# Patient Record
Sex: FEMALE | Race: WHITE | NOT HISPANIC OR LATINO
[De-identification: names, ages, dates, MRNs, and addresses within clinical notes are randomized per-mention and may not be internally consistent; named-entity substitution may affect disease eponyms.]

---

## 2019-10-02 ENCOUNTER — LABORATORY RESULT (OUTPATIENT)
Age: 39
End: 2019-10-02

## 2019-10-10 ENCOUNTER — APPOINTMENT (OUTPATIENT)
Dept: INTERNAL MEDICINE | Facility: CLINIC | Age: 39
End: 2019-10-10
Payer: COMMERCIAL

## 2019-10-10 ENCOUNTER — NON-APPOINTMENT (OUTPATIENT)
Age: 39
End: 2019-10-10

## 2019-10-10 VITALS
DIASTOLIC BLOOD PRESSURE: 70 MMHG | HEART RATE: 62 BPM | BODY MASS INDEX: 18.95 KG/M2 | TEMPERATURE: 98.6 F | OXYGEN SATURATION: 97 % | HEIGHT: 64 IN | WEIGHT: 111 LBS | SYSTOLIC BLOOD PRESSURE: 80 MMHG

## 2019-10-10 VITALS — SYSTOLIC BLOOD PRESSURE: 100 MMHG | DIASTOLIC BLOOD PRESSURE: 60 MMHG

## 2019-10-10 DIAGNOSIS — Z82.0 FAMILY HISTORY OF EPILEPSY AND OTHER DISEASES OF THE NERVOUS SYSTEM: ICD-10-CM

## 2019-10-10 DIAGNOSIS — Z80.0 FAMILY HISTORY OF MALIGNANT NEOPLASM OF DIGESTIVE ORGANS: ICD-10-CM

## 2019-10-10 DIAGNOSIS — Z78.9 OTHER SPECIFIED HEALTH STATUS: ICD-10-CM

## 2019-10-10 DIAGNOSIS — Z83.49 FAMILY HISTORY OF OTHER ENDOCRINE, NUTRITIONAL AND METABOLIC DISEASES: ICD-10-CM

## 2019-10-10 DIAGNOSIS — Z81.8 FAMILY HISTORY OF OTHER MENTAL AND BEHAVIORAL DISORDERS: ICD-10-CM

## 2019-10-10 DIAGNOSIS — Z80.1 FAMILY HISTORY OF MALIGNANT NEOPLASM OF TRACHEA, BRONCHUS AND LUNG: ICD-10-CM

## 2019-10-10 DIAGNOSIS — R51 HEADACHE: ICD-10-CM

## 2019-10-10 PROCEDURE — 99396 PREV VISIT EST AGE 40-64: CPT | Mod: 25

## 2019-10-10 PROCEDURE — 99205 OFFICE O/P NEW HI 60 MIN: CPT | Mod: 25

## 2019-10-10 PROCEDURE — 93000 ELECTROCARDIOGRAM COMPLETE: CPT

## 2019-10-10 NOTE — HEALTH RISK ASSESSMENT
[Patient reported mammogram was normal] : Patient reported mammogram was normal [Patient reported PAP Smear was normal] : Patient reported PAP Smear was normal [Good] : ~his/her~  mood as  good [Yes] : Yes [0] : 2) Feeling down, depressed, or hopeless: Not at all (0) [With Family] : lives with family [# of Members in Household ___] :  household currently consist of [unfilled] member(s) [Unemployed] : unemployed [Graduate School] : graduate school [] :  [# Of Children ___] : has [unfilled] children [Feels Safe at Home] : Feels safe at home [Fully functional (bathing, dressing, toileting, transferring, walking, feeding)] : Fully functional (bathing, dressing, toileting, transferring, walking, feeding) [Fully functional (using the telephone, shopping, preparing meals, housekeeping, doing laundry, using] : Fully functional and needs no help or supervision to perform IADLs (using the telephone, shopping, preparing meals, housekeeping, doing laundry, using transportation, managing medications and managing finances) [Smoke Detector] : smoke detector [Carbon Monoxide Detector] : carbon monoxide detector [Seat Belt] :  uses seat belt [Sunscreen] : uses sunscreen [] : No [de-identified] : Dermatology- Dr. Greenberg  Ophthalmology-Dr. Harvey   Gynecology- Dr. De Leon [de-identified] : on weekends only [de-identified] : exercises 5 days per week  [de-identified] : one fall one  week ago [Change in mental status noted] : No change in mental status noted [Language] : denies difficulty with language [Behavior] : denies difficulty with behavior [Handling Complex Tasks] : denies difficulty handling complex tasks [Reasoning] : denies difficulty with reasoning [Reports changes in hearing] : Reports no changes in hearing [Reports changes in vision] : Reports no changes in vision [Reports changes in dental health] : Reports no changes in dental health [MammogramDate] : 10/18 [PapSmearDate] : 09/19

## 2019-10-10 NOTE — HEALTH RISK ASSESSMENT
[Patient reported PAP Smear was normal] : Patient reported PAP Smear was normal [Patient reported mammogram was normal] : Patient reported mammogram was normal [Good] : ~his/her~  mood as  good [Yes] : Yes [0] : 2) Feeling down, depressed, or hopeless: Not at all (0) [With Family] : lives with family [# of Members in Household ___] :  household currently consist of [unfilled] member(s) [Unemployed] : unemployed [Graduate School] : graduate school [] :  [# Of Children ___] : has [unfilled] children [Feels Safe at Home] : Feels safe at home [Fully functional (bathing, dressing, toileting, transferring, walking, feeding)] : Fully functional (bathing, dressing, toileting, transferring, walking, feeding) [Fully functional (using the telephone, shopping, preparing meals, housekeeping, doing laundry, using] : Fully functional and needs no help or supervision to perform IADLs (using the telephone, shopping, preparing meals, housekeeping, doing laundry, using transportation, managing medications and managing finances) [Smoke Detector] : smoke detector [Carbon Monoxide Detector] : carbon monoxide detector [Seat Belt] :  uses seat belt [Sunscreen] : uses sunscreen [] : No [de-identified] : Dermatology- Dr. Greenberg  Ophthalmology-Dr. Harvey   Gynecology- Dr. De Leon [de-identified] : on weekends only [de-identified] : exercises 5 days per week  [de-identified] : one fall one  week ago [Change in mental status noted] : No change in mental status noted [Language] : denies difficulty with language [Behavior] : denies difficulty with behavior [Handling Complex Tasks] : denies difficulty handling complex tasks [Reasoning] : denies difficulty with reasoning [Reports changes in hearing] : Reports no changes in hearing [Reports changes in vision] : Reports no changes in vision [Reports changes in dental health] : Reports no changes in dental health [MammogramDate] : 10/18 [PapSmearDate] : 09/19

## 2019-10-10 NOTE — PAST MEDICAL HISTORY
[Definite ___ (Date)] : the last menstrual period was [unfilled] [Normal Amount/Duration] : it was of a normal amount and duration [AB Spont ___] : miscarriages: [unfilled]  [Menstruating] : menstruating [Regular Cycle Intervals] : have been regular [Total Preg ___] : G[unfilled] [Live Births ___] : P[unfilled]

## 2019-10-10 NOTE — HISTORY OF PRESENT ILLNESS
[de-identified] : The patient is a 39-year-old female with past medical history significant for anxiety disorder presents for routine physical.  She reports that she was last seen by me approximately 5 years ago.  At that time she was felt to be suffering from underlying anxiety.  She admits that she was seen by a neurologist at that time who agreed.  Patient opted to not take medications at the time and has since been feeling much better.  She denies feeling anxious or depressed.  She continues to exercise regularly without difficulty.  The patient reports that approximately 1-1/2 weeks ago she tripped on her pant leg and hit her chin on the ground.  Since that time she has had a vague, dull, generalized headache.  She also admits that it was around the time of her period which may have contributed to her symptoms.  She has been taking Advil and drinking caffeine which has helped with her symptoms.  She admits that this week her headache is lessened.  She denies vision changes, photosensitivity, nausea, or vomiting. [FreeTextEntry1] : The patient is a 39 year  old female who presents for annual physical examination.\par

## 2019-10-10 NOTE — ASSESSMENT
[FreeTextEntry1] : Healthcare maintenance: Patient is scheduled for a mammogram next week.  She is up-to-date with her gynecological examination.

## 2019-10-10 NOTE — HISTORY OF PRESENT ILLNESS
[de-identified] : The patient is a 39-year-old female with past medical history significant for anxiety disorder presents for routine physical.  She reports that she was last seen by me approximately 5 years ago.  At that time she was felt to be suffering from underlying anxiety.  She admits that she was seen by a neurologist at that time who agreed.  Patient opted to not take medications at the time and has since been feeling much better.  She denies feeling anxious or depressed.  She continues to exercise regularly without difficulty.  The patient reports that approximately 1-1/2 weeks ago she tripped on her pant leg and hit her chin on the ground.  Since that time she has had a vague, dull, generalized headache.  She also admits that it was around the time of her period which may have contributed to her symptoms.  She has been taking Advil and drinking caffeine which has helped with her symptoms.  She admits that this week her headache is lessened.  She denies vision changes, photosensitivity, nausea, or vomiting. [FreeTextEntry1] : The patient is a 39 year  old female who presents for annual physical examination.\par

## 2020-11-02 ENCOUNTER — RESULT REVIEW (OUTPATIENT)
Age: 40
End: 2020-11-02

## 2021-10-28 ENCOUNTER — RESULT REVIEW (OUTPATIENT)
Age: 41
End: 2021-10-28

## 2022-10-17 ENCOUNTER — RESULT REVIEW (OUTPATIENT)
Age: 42
End: 2022-10-17

## 2023-01-12 ENCOUNTER — APPOINTMENT (OUTPATIENT)
Dept: DERMATOLOGY | Facility: CLINIC | Age: 43
End: 2023-01-12
Payer: COMMERCIAL

## 2023-01-12 VITALS — WEIGHT: 110 LBS | BODY MASS INDEX: 18.78 KG/M2 | HEIGHT: 64 IN

## 2023-01-12 DIAGNOSIS — L98.8 OTHER SPECIFIED DISORDERS OF THE SKIN AND SUBCUTANEOUS TISSUE: ICD-10-CM

## 2023-01-12 DIAGNOSIS — L57.0 ACTINIC KERATOSIS: ICD-10-CM

## 2023-01-12 DIAGNOSIS — L82.1 OTHER SEBORRHEIC KERATOSIS: ICD-10-CM

## 2023-01-12 PROCEDURE — 99204 OFFICE O/P NEW MOD 45 MIN: CPT

## 2023-01-25 ENCOUNTER — NON-APPOINTMENT (OUTPATIENT)
Age: 43
End: 2023-01-25

## 2023-01-25 ENCOUNTER — APPOINTMENT (OUTPATIENT)
Dept: SURGICAL ONCOLOGY | Facility: CLINIC | Age: 43
End: 2023-01-25
Payer: COMMERCIAL

## 2023-01-25 VITALS
RESPIRATION RATE: 16 BRPM | HEIGHT: 64 IN | WEIGHT: 110 LBS | HEART RATE: 69 BPM | OXYGEN SATURATION: 99 % | SYSTOLIC BLOOD PRESSURE: 104 MMHG | BODY MASS INDEX: 18.78 KG/M2 | DIASTOLIC BLOOD PRESSURE: 67 MMHG

## 2023-01-25 DIAGNOSIS — N64.52 NIPPLE DISCHARGE: ICD-10-CM

## 2023-01-25 PROCEDURE — 99205 OFFICE O/P NEW HI 60 MIN: CPT

## 2023-01-25 NOTE — ASSESSMENT
[FreeTextEntry1] : Right bloody nipple discharge \par BI-RADS 1 imaging January 2023 \par Reassured patient that index of suspicion for malignancy is low\par Suspect intraductal papilloma\par Will schedule for right duct excision \par Surgical procedure discussed in detail\par All questions answered

## 2023-01-25 NOTE — ADDENDUM
[FreeTextEntry1] : I, Yael Martin, acted solely as a scribe for Dr. Tomasz Maxwell on this date 01/25/2023.

## 2023-01-25 NOTE — HISTORY OF PRESENT ILLNESS
[de-identified] : Patient is a 43 y/o female who presents an initial consultation for episode of spontaneous right breast brownish/clear, non-bloody discharge after her mammogram back in December. Hx status post right biopsy on 1/10/22, fibroadenoma. \par \par Callback right mammogram and right ultrasound performed on 1/04/23 which showed no mammographic or sonographic evidence of malignancy in the right breast. (BI-RADS 1)\par \par Patient underwent screening bilateral mammo/sono on 12/13/22 which showed right nipple asymmetry, which may represent a skin lesions. Technologist reported that the pt had clear/brown color nipple discharge after the exam which may be related. Recommend additional mammographic views and targeted ultrasound. There is no mammographic or sonographic evidence of malignancy in the left breast. BI-RADS 2 sonogram.\par \par Biopsy (1/10/22):\par A. Right breast 12:00 3 cm FN, core biopsy:\par -Fibroadenoma \par \par No significant past medical history. \par Denies any family history of breast cancer. Paternal grandfather had esophageus cancer.

## 2023-01-25 NOTE — PHYSICAL EXAM
[Normal] : supple, no neck mass and thyroid not enlarged [Normal Neck Lymph Nodes] : normal neck lymph nodes  [Normal Supraclavicular Lymph Nodes] : normal supraclavicular lymph nodes [Normal Groin Lymph Nodes] : normal groin lymph nodes [Normal Axillary Lymph Nodes] : normal axillary lymph nodes [Normal] : oriented to person, place and time, with appropriate affect [de-identified] : brown discharge from right 10:00 duct and clear discharge from right central duct- GUAIAC positive.  No masses or adenopathy bilaterally

## 2023-01-25 NOTE — CONSULT LETTER
[Dear  ___] : Dear  [unfilled], [Consult Letter:] : I had the pleasure of evaluating your patient, [unfilled]. [Please see my note below.] : Please see my note below. [Sincerely,] : Sincerely, [FreeTextEntry3] : Tomasz Maxwell MD FACS\par

## 2023-01-31 ENCOUNTER — OUTPATIENT (OUTPATIENT)
Dept: OUTPATIENT SERVICES | Facility: HOSPITAL | Age: 43
LOS: 1 days | End: 2023-01-31

## 2023-01-31 VITALS
RESPIRATION RATE: 15 BRPM | HEIGHT: 64 IN | WEIGHT: 108.03 LBS | OXYGEN SATURATION: 98 % | SYSTOLIC BLOOD PRESSURE: 110 MMHG | DIASTOLIC BLOOD PRESSURE: 69 MMHG | TEMPERATURE: 97 F | HEART RATE: 63 BPM

## 2023-01-31 DIAGNOSIS — N64.52 NIPPLE DISCHARGE: ICD-10-CM

## 2023-01-31 DIAGNOSIS — Z98.890 OTHER SPECIFIED POSTPROCEDURAL STATES: Chronic | ICD-10-CM

## 2023-01-31 DIAGNOSIS — Z87.898 PERSONAL HISTORY OF OTHER SPECIFIED CONDITIONS: ICD-10-CM

## 2023-01-31 DIAGNOSIS — R09.89 OTHER SPECIFIED SYMPTOMS AND SIGNS INVOLVING THE CIRCULATORY AND RESPIRATORY SYSTEMS: ICD-10-CM

## 2023-01-31 LAB
ANION GAP SERPL CALC-SCNC: 10 MMOL/L — SIGNIFICANT CHANGE UP (ref 7–14)
BUN SERPL-MCNC: 11 MG/DL — SIGNIFICANT CHANGE UP (ref 7–23)
CALCIUM SERPL-MCNC: 9 MG/DL — SIGNIFICANT CHANGE UP (ref 8.4–10.5)
CHLORIDE SERPL-SCNC: 103 MMOL/L — SIGNIFICANT CHANGE UP (ref 98–107)
CO2 SERPL-SCNC: 27 MMOL/L — SIGNIFICANT CHANGE UP (ref 22–31)
CREAT SERPL-MCNC: 0.65 MG/DL — SIGNIFICANT CHANGE UP (ref 0.5–1.3)
EGFR: 111 ML/MIN/1.73M2 — SIGNIFICANT CHANGE UP
GLUCOSE SERPL-MCNC: 73 MG/DL — SIGNIFICANT CHANGE UP (ref 70–99)
HCG SERPL-ACNC: <5 MIU/ML — SIGNIFICANT CHANGE UP
HCT VFR BLD CALC: 37 % — SIGNIFICANT CHANGE UP (ref 34.5–45)
HGB BLD-MCNC: 11.8 G/DL — SIGNIFICANT CHANGE UP (ref 11.5–15.5)
MCHC RBC-ENTMCNC: 28.7 PG — SIGNIFICANT CHANGE UP (ref 27–34)
MCHC RBC-ENTMCNC: 31.9 GM/DL — LOW (ref 32–36)
MCV RBC AUTO: 90 FL — SIGNIFICANT CHANGE UP (ref 80–100)
NRBC # BLD: 0 /100 WBCS — SIGNIFICANT CHANGE UP (ref 0–0)
NRBC # FLD: 0 K/UL — SIGNIFICANT CHANGE UP (ref 0–0)
PLATELET # BLD AUTO: 210 K/UL — SIGNIFICANT CHANGE UP (ref 150–400)
POTASSIUM SERPL-MCNC: 3.8 MMOL/L — SIGNIFICANT CHANGE UP (ref 3.5–5.3)
POTASSIUM SERPL-SCNC: 3.8 MMOL/L — SIGNIFICANT CHANGE UP (ref 3.5–5.3)
RBC # BLD: 4.11 M/UL — SIGNIFICANT CHANGE UP (ref 3.8–5.2)
RBC # FLD: 12.4 % — SIGNIFICANT CHANGE UP (ref 10.3–14.5)
SODIUM SERPL-SCNC: 140 MMOL/L — SIGNIFICANT CHANGE UP (ref 135–145)
WBC # BLD: 4.62 K/UL — SIGNIFICANT CHANGE UP (ref 3.8–10.5)
WBC # FLD AUTO: 4.62 K/UL — SIGNIFICANT CHANGE UP (ref 3.8–10.5)

## 2023-01-31 NOTE — H&P PST ADULT - ASSESSMENT
42 year old female with no pertinent PMH, presents to [facility], with pre op diagnosis of right nipple discharge- intraductal papilloma, for pre op evaluation prior to scheduled surgery- right breast duct excision with Dr Maxwell.

## 2023-01-31 NOTE — H&P PST ADULT - NSICDXPASTMEDICALHX_GEN_ALL_CORE_FT
PAST MEDICAL HISTORY:  Anxiety     H/O hemorrhoids      PAST MEDICAL HISTORY:  Anxiety     Fibroadenoma     H/O hemorrhoids      PAST MEDICAL HISTORY:  Anxiety     Fibroadenoma     H/O hemorrhoids     H/O seborrheic keratosis

## 2023-01-31 NOTE — H&P PST ADULT - HISTORY OF PRESENT ILLNESS
42 year old female with no pertinent PMH, presents to [facility], with pre op diagnosis of right nipple discharge- intraductal papilloma, for pre op evaluation prior to scheduled surgery- right breast duct excision with Dr Maxwell.

## 2023-01-31 NOTE — H&P PST ADULT - NSICDXFAMILYHX_GEN_ALL_CORE_FT
FAMILY HISTORY:  Father  Still living? Unknown  FH: Parkinson's disease, Age at diagnosis: Age Unknown

## 2023-01-31 NOTE — H&P PST ADULT - PROBLEM SELECTOR PLAN 1
Patient is tentatively scheduled  surgery- right breast duct excision with Dr Maxwell on 02/06/2023.    Pre-op instructions provided. Pt given verbal and written instructions with teach back on chlorhexidine wash and pepcid. Pt verbalized understanding with return demonstration.    Routine Covid PCR test ordered .Instructions regarding covid PCR test and locations for covid testing site provided. Pt verbalized understanding.  Urine cup provided for day of procedure pregnancy test.  CBC, BMP, HCG sent

## 2023-02-02 ENCOUNTER — LABORATORY RESULT (OUTPATIENT)
Age: 43
End: 2023-02-02

## 2023-02-03 VITALS
HEART RATE: 67 BPM | TEMPERATURE: 97 F | OXYGEN SATURATION: 100 % | SYSTOLIC BLOOD PRESSURE: 109 MMHG | WEIGHT: 108.03 LBS | HEIGHT: 64 IN | RESPIRATION RATE: 18 BRPM | DIASTOLIC BLOOD PRESSURE: 72 MMHG

## 2023-02-03 NOTE — ASU PREOPERATIVE ASSESSMENT, ADULT ([facility] ONLY) - FALL HARM RISK - UNIVERSAL INTERVENTIONS
Followed protocol
Bed in lowest position, wheels locked, appropriate side rails in place/Call bell, personal items and telephone in reach/Instruct patient to call for assistance before getting out of bed or chair/Non-slip footwear when patient is out of bed/[place] to call system/Physically safe environment - no spills, clutter or unnecessary equipment/Purposeful Proactive Rounding/Room/bathroom lighting operational, light cord in reach

## 2023-02-05 ENCOUNTER — TRANSCRIPTION ENCOUNTER (OUTPATIENT)
Age: 43
End: 2023-02-05

## 2023-02-06 ENCOUNTER — RESULT REVIEW (OUTPATIENT)
Age: 43
End: 2023-02-06

## 2023-02-06 ENCOUNTER — APPOINTMENT (OUTPATIENT)
Dept: SURGICAL ONCOLOGY | Facility: AMBULATORY SURGERY CENTER | Age: 43
End: 2023-02-06

## 2023-02-06 ENCOUNTER — TRANSCRIPTION ENCOUNTER (OUTPATIENT)
Age: 43
End: 2023-02-06

## 2023-02-06 ENCOUNTER — OUTPATIENT (OUTPATIENT)
Dept: OUTPATIENT SERVICES | Facility: HOSPITAL | Age: 43
LOS: 1 days | Discharge: HOME | End: 2023-02-06
Payer: COMMERCIAL

## 2023-02-06 VITALS
RESPIRATION RATE: 16 BRPM | DIASTOLIC BLOOD PRESSURE: 72 MMHG | HEART RATE: 66 BPM | OXYGEN SATURATION: 99 % | SYSTOLIC BLOOD PRESSURE: 108 MMHG

## 2023-02-06 DIAGNOSIS — Z98.890 OTHER SPECIFIED POSTPROCEDURAL STATES: Chronic | ICD-10-CM

## 2023-02-06 DIAGNOSIS — N64.52 NIPPLE DISCHARGE: ICD-10-CM

## 2023-02-06 PROCEDURE — 19120 REMOVAL OF BREAST LESION: CPT

## 2023-02-06 PROCEDURE — 88305 TISSUE EXAM BY PATHOLOGIST: CPT | Mod: 26

## 2023-02-06 PROCEDURE — 14000 TIS TRNFR TRUNK 10 SQ CM/<: CPT

## 2023-02-06 RX ORDER — OXYCODONE HYDROCHLORIDE 5 MG/1
1 TABLET ORAL
Qty: 15 | Refills: 0
Start: 2023-02-06 | End: 2023-02-09

## 2023-02-06 NOTE — BRIEF OPERATIVE NOTE - OPERATION/FINDINGS
the duct with bloody nipple discharge was identified and lacrimal probe was placed into the duct. Then a pre alealar incision was made abd the duct was identified and excised.

## 2023-02-06 NOTE — ASU DISCHARGE PLAN (ADULT/PEDIATRIC) - ASU DC SPECIAL INSTRUCTIONSFT
[facility]  CANCER  I  N  S  T  I  T  U  T E     Department of Surgery  Division of Surgical Oncology    Mainor Lara M.D., PAUL.  Chief, Division of Surgical Oncology    Federico Fulton M.D., F.A.C.S., F.A.S.MARIELOS.  Associate , Department of Surgery    Rashel To M.D., SARVANI.BARRY.C.S.  Ilan Pascual M.D., F.BARRY.C.S.  Tomasz Maxwell M.D., F.A.C.S.  Kana Macias M.D., F.A.C.S.  Ilan Junior M.D., F.A.C.S.  Ty Rivas M.D., CANDICEC.S.      Breast Biopsy/Lumpectomy Post-operative Instructions  1.	Supportive bra- Please bring a sports/athletic bra with you on the day of your surgery.  You will wear it home from the hospital.  You should wear the supportive bra continuously for 48 hours after the procedure, including wearing it to sleep.  Therefore, you may wear your regular bra.  You may remove the bra to shower.  The sports bra will provide support, decrease the amount of swelling at the biopsy site, and make your recovery more comfortable.    2.	Wound dressing- there are white surgical tapes (called steri strips) which are directly adherent to the skin.  These should remain on the skin, and will peel off naturally.  All of the stitches are “internal” and will dissolve naturally.    3.	Showering/Bathing- You may shower over the dressing the very next day after surgery.  Allow the water to run over the dressing, but don not scrub the area.  It is best not to sit in a bathtub or swimming pool for at least one week after surgery.    4.	Activity level- You may resume most normal daily activity as tolerated, but avoid strenuous activities such as aerobics, jogging, exercising or heavy lifting for at least 1 week after surgery.  You may return to work in 1-2 days after surgery.  You may drive as long as you are not taking any prescription pain medication.    5.	Pain Medication- You may take the prescribed medication, or you may take extra-strength Tylenol as needed.  Please don't take aspirin, Motrin, Advil or any other anti-inflammatory medications, as these medications may cause bleeding or bruising.    6.	Follow-up Appointment- Please call the office to schedule your post-operative appointment which should be approximately 10 days after your surgery. Office [phone]    7.	Bruising/Bleeding/Swelling- It is normal for there to be some bruising and swelling at the breast biopsy site, and there may be some staining of blood on to the dressing.  Some discomfort at the surgical site is expected.  If your symptoms seem excessive, or if you have any question or concerns, please call the office.      Anesthesia Precautions:  For the next 12 hours do not:   •	drive a car,  •	drink alcohol, beer, or wine,   •	make important personal or business decisions  Diet:   •	Progress diet slowly unless otherwise indicated    Physician Notification  •	Any Prescription issues  •	Bleeding that does not stop  •	Persistent nausea and vomiting  •	Pain not relieved by medications  •	Fever greater than 101®F  •	Inability to tolerate liquids or foods  •	Unable to urinate after 8 hours  Discharge and Disposition  •	Discharge to home/ group home/assisted living  •	Accompanied by Family/Spouse/ Parents/ Significant Other/ Friend/ and or Caregiver    Follow Up Care:  •	In the event that you develop a complication and you are unable to reach your own physician, you may contact:  [phone] or go to the nearest Emergency Room.   •	Please call your surgeon to schedule your follow up appointment [phone]

## 2023-02-06 NOTE — ASU DISCHARGE PLAN (ADULT/PEDIATRIC) - NS MD DC FALL RISK RISK
For information on Fall & Injury Prevention, visit: [email or web address] OR  [email or web address] OR  https://www.cdc.gov/steadi/patient.html

## 2023-02-06 NOTE — ASU DISCHARGE PLAN (ADULT/PEDIATRIC) - CALL YOUR DOCTOR IF YOU HAVE ANY OF THE FOLLOWING:
Bleeding that does not stop/Swelling that gets worse/Pain not relieved by Medications/Wound/Surgical Site with redness, or foul smelling discharge or pus/Nausea and vomiting that does not stop Bleeding that does not stop/Swelling that gets worse/Pain not relieved by Medications/Fever greater than (need to indicate Fahrenheit or Celsius)/Wound/Surgical Site with redness, or foul smelling discharge or pus/Nausea and vomiting that does not stop

## 2023-02-06 NOTE — ASU DISCHARGE PLAN (ADULT/PEDIATRIC) - CARE PROVIDER_API CALL
Tomasz Maxwell)  Surgery  [address] [place] [address]  Phone: [phone]  Fax: [phone]  Follow Up Time: 2 weeks

## 2023-02-13 LAB — SURGICAL PATHOLOGY STUDY: SIGNIFICANT CHANGE UP

## 2023-02-17 ENCOUNTER — APPOINTMENT (OUTPATIENT)
Dept: SURGICAL ONCOLOGY | Facility: CLINIC | Age: 43
End: 2023-02-17
Payer: COMMERCIAL

## 2023-02-17 VITALS
HEART RATE: 80 BPM | SYSTOLIC BLOOD PRESSURE: 100 MMHG | OXYGEN SATURATION: 98 % | WEIGHT: 110 LBS | HEIGHT: 64 IN | DIASTOLIC BLOOD PRESSURE: 67 MMHG | RESPIRATION RATE: 15 BRPM | BODY MASS INDEX: 18.78 KG/M2

## 2023-02-17 DIAGNOSIS — D24.1 BENIGN NEOPLASM OF RIGHT BREAST: ICD-10-CM

## 2023-02-17 PROBLEM — F41.9 ANXIETY DISORDER, UNSPECIFIED: Chronic | Status: ACTIVE | Noted: 2023-01-31

## 2023-02-17 PROBLEM — Z87.2 PERSONAL HISTORY OF DISEASES OF THE SKIN AND SUBCUTANEOUS TISSUE: Chronic | Status: ACTIVE | Noted: 2023-01-31

## 2023-02-17 PROBLEM — D24.9 BENIGN NEOPLASM OF UNSPECIFIED BREAST: Chronic | Status: ACTIVE | Noted: 2023-01-31

## 2023-02-17 PROBLEM — Z87.19 PERSONAL HISTORY OF OTHER DISEASES OF THE DIGESTIVE SYSTEM: Chronic | Status: ACTIVE | Noted: 2023-01-31

## 2023-02-17 PROCEDURE — 99024 POSTOP FOLLOW-UP VISIT: CPT

## 2023-02-17 NOTE — HISTORY OF PRESENT ILLNESS
[de-identified] : Patient is a 41 y/o female who presents a post op. \par Hx status post right biopsy on 1/10/22, fibroadenoma. \par Today she is status post right duct excision on 2/6/23 for episode of right bloody nipple discharge. No complaints postop. \par \par Final pathology (2/06/23):\par Breast ductal system, right; excision:\par - Duct ectasia.\par - Minute intraductal papilloma.\par - Fibrocystic changes.\par \par Callback right mammogram and right ultrasound performed on 1/04/23 which showed no mammographic or sonographic evidence of malignancy in the right breast. (BI-RADS 1)\par \par Patient underwent screening bilateral mammo/sono on 12/13/22 which showed right nipple asymmetry, which may represent a skin lesions. Technologist reported that the pt had clear/brown color nipple discharge after the exam which may be related. Recommend additional mammographic views and targeted ultrasound. There is no mammographic or sonographic evidence of malignancy in the left breast. BI-RADS 2 sonogram.\par \par Biopsy (1/10/22):\par A. Right breast 12:00 3 cm FN, core biopsy:\par -Fibroadenoma \par \par No significant past medical history. \par Denies any family history of breast cancer. Paternal grandfather had esophageus cancer.

## 2023-02-17 NOTE — ADDENDUM
[FreeTextEntry1] : I, Yael Martin, acted solely as a scribe for Dr. Tomasz Maxwell on this date 02/17/2023.\par

## 2023-02-17 NOTE — ASSESSMENT
[FreeTextEntry1] : S/p right duct excision -  small intraductal papilloma, no malignancy on final pathology\par Normal postoperative course\par Continue yearly breast imaging December 2023\par RTO after imaging \par \par \par \par \par \par Enclosed pathology report\par

## 2023-02-17 NOTE — PHYSICAL EXAM
[Normal] : supple, no neck mass and thyroid not enlarged [Normal Neck Lymph Nodes] : normal neck lymph nodes  [Normal Supraclavicular Lymph Nodes] : normal supraclavicular lymph nodes [Normal Groin Lymph Nodes] : normal groin lymph nodes [Normal Axillary Lymph Nodes] : normal axillary lymph nodes [Normal] : oriented to person, place and time, with appropriate affect [de-identified] : right areolar incision healing well.  no evidence of infection.  sutures removed and sterile dressing applied.

## 2023-07-31 ENCOUNTER — APPOINTMENT (OUTPATIENT)
Dept: INTERNAL MEDICINE | Facility: CLINIC | Age: 43
End: 2023-07-31
Payer: COMMERCIAL

## 2023-07-31 VITALS
OXYGEN SATURATION: 98 % | DIASTOLIC BLOOD PRESSURE: 64 MMHG | BODY MASS INDEX: 18.78 KG/M2 | TEMPERATURE: 98.6 F | WEIGHT: 110 LBS | HEIGHT: 64 IN | HEART RATE: 69 BPM | SYSTOLIC BLOOD PRESSURE: 101 MMHG

## 2023-07-31 DIAGNOSIS — Z00.00 ENCOUNTER FOR GENERAL ADULT MEDICAL EXAMINATION W/OUT ABNORMAL FINDINGS: ICD-10-CM

## 2023-07-31 PROCEDURE — 36415 COLL VENOUS BLD VENIPUNCTURE: CPT

## 2023-07-31 PROCEDURE — 99386 PREV VISIT NEW AGE 40-64: CPT | Mod: 25

## 2023-07-31 NOTE — HISTORY OF PRESENT ILLNESS
[FreeTextEntry1] : cpe/est care [de-identified] : SEJAL RODRIGUEZ is a 43 year F who presents for CPE/est care PMHx R breast papilloma (s/p excision 2/2023) Prior PCP Dr. Zayda Worthy  Feels well overall   Ob/gyn Dr. Lucia Elizabeth  Breast surgeon Dr. Tomasz Maxwell Derm Dr. Heller

## 2023-07-31 NOTE — HEALTH RISK ASSESSMENT
[Yes] : Yes [2 - 4 times a month (2 pts)] : 2-4 times a month (2 points) [1 or 2 (0 pts)] : 1 or 2 (0 points) [Never (0 pts)] : Never (0 points) [No] : In the past 12 months have you used drugs other than those required for medical reasons? No [0] : 2) Feeling down, depressed, or hopeless: Not at all (0) [PHQ-2 Negative - No further assessment needed] : PHQ-2 Negative - No further assessment needed [Patient reported PAP Smear was normal] : Patient reported PAP Smear was normal [Employed] : employed [] :  [# Of Children ___] : has [unfilled] children [Feels Safe at Home] : Feels safe at home [Fully functional (bathing, dressing, toileting, transferring, walking, feeding)] : Fully functional (bathing, dressing, toileting, transferring, walking, feeding) [Fully functional (using the telephone, shopping, preparing meals, housekeeping, doing laundry, using] : Fully functional and needs no help or supervision to perform IADLs (using the telephone, shopping, preparing meals, housekeeping, doing laundry, using transportation, managing medications and managing finances) [Never] : Never [Audit-CScore] : 2 [[id number]] : 0 [MammogramDate] : 1/2023 [PapSmearDate] : fall 2022 [de-identified] : , 3 children (1 daughter, 2 sons) [ColonoscopyDate] : due  [FreeTextEntry2] :

## 2023-07-31 NOTE — PHYSICAL EXAM
no [No Acute Distress] : no acute distress [Well-Appearing] : well-appearing [Normal Sclera/Conjunctiva] : normal sclera/conjunctiva [EOMI] : extraocular movements intact [No Lymphadenopathy] : no lymphadenopathy [Thyroid Normal, No Nodules] : the thyroid was normal and there were no nodules present [No Respiratory Distress] : no respiratory distress  [No Accessory Muscle Use] : no accessory muscle use [Clear to Auscultation] : lungs were clear to auscultation bilaterally [Normal Rate] : normal rate  [Regular Rhythm] : with a regular rhythm [Normal S1, S2] : normal S1 and S2 [No Edema] : there was no peripheral edema [No Extremity Clubbing/Cyanosis] : no extremity clubbing/cyanosis [Soft] : abdomen soft [Non Tender] : non-tender [Non-distended] : non-distended [Normal Bowel Sounds] : normal bowel sounds [Coordination Grossly Intact] : coordination grossly intact [No Focal Deficits] : no focal deficits [Normal Gait] : normal gait [Normal Affect] : the affect was normal [Normal Insight/Judgement] : insight and judgment were intact

## 2023-07-31 NOTE — ASSESSMENT
[FreeTextEntry1] : HCM -well visit -routine labs ordered -depression screen negative -mammogram 1/2023, due 1/2024 (will obtain script from gyn) -pap smear- 10/2022, sees gyn annually  -colonoscopy- due, GI referral given  -flu vaccine- fall 2022 (reported)  -covid vaccines- s/p 3 doses (reported) -tdap- -unsure when last received, advised to check records and obtain if 10+ years since last dose -advised to get annual eye, dental, and skin exams

## 2023-08-01 LAB
25(OH)D3 SERPL-MCNC: 28.6 NG/ML
ALBUMIN SERPL ELPH-MCNC: 4.8 G/DL
ALP BLD-CCNC: 33 U/L
ALT SERPL-CCNC: 8 U/L
ANION GAP SERPL CALC-SCNC: 12 MMOL/L
AST SERPL-CCNC: 13 U/L
BILIRUB SERPL-MCNC: 0.8 MG/DL
BUN SERPL-MCNC: 12 MG/DL
CALCIUM SERPL-MCNC: 9.2 MG/DL
CHLORIDE SERPL-SCNC: 105 MMOL/L
CHOLEST SERPL-MCNC: 177 MG/DL
CO2 SERPL-SCNC: 24 MMOL/L
CREAT SERPL-MCNC: 0.78 MG/DL
EGFR: 95 ML/MIN/1.73M2
ESTIMATED AVERAGE GLUCOSE: 105 MG/DL
GLUCOSE SERPL-MCNC: 82 MG/DL
HBA1C MFR BLD HPLC: 5.3 %
HCT VFR BLD CALC: 39.2 %
HDLC SERPL-MCNC: 88 MG/DL
HGB BLD-MCNC: 12.4 G/DL
LDLC SERPL CALC-MCNC: 77 MG/DL
MCHC RBC-ENTMCNC: 29.9 PG
MCHC RBC-ENTMCNC: 31.6 GM/DL
MCV RBC AUTO: 94.5 FL
NONHDLC SERPL-MCNC: 89 MG/DL
PLATELET # BLD AUTO: 223 K/UL
POTASSIUM SERPL-SCNC: 4.5 MMOL/L
PROT SERPL-MCNC: 6.7 G/DL
RBC # BLD: 4.15 M/UL
RBC # FLD: 12.9 %
SODIUM SERPL-SCNC: 142 MMOL/L
TRIGL SERPL-MCNC: 58 MG/DL
TSH SERPL-ACNC: 0.93 UIU/ML
WBC # FLD AUTO: 5.59 K/UL

## 2023-08-02 LAB
M TB IFN-G BLD-IMP: NEGATIVE
QUANTIFERON TB PLUS MITOGEN MINUS NIL: 4.38 IU/ML
QUANTIFERON TB PLUS NIL: 0.01 IU/ML
QUANTIFERON TB PLUS TB1 MINUS NIL: 0 IU/ML
QUANTIFERON TB PLUS TB2 MINUS NIL: 0 IU/ML

## 2023-09-12 ENCOUNTER — APPOINTMENT (OUTPATIENT)
Dept: INTERNAL MEDICINE | Facility: CLINIC | Age: 43
End: 2023-09-12

## 2023-11-03 ENCOUNTER — APPOINTMENT (OUTPATIENT)
Dept: GASTROENTEROLOGY | Facility: CLINIC | Age: 43
End: 2023-11-03
Payer: COMMERCIAL

## 2023-11-03 VITALS
DIASTOLIC BLOOD PRESSURE: 66 MMHG | TEMPERATURE: 98.2 F | HEIGHT: 64 IN | HEART RATE: 60 BPM | BODY MASS INDEX: 19.63 KG/M2 | OXYGEN SATURATION: 98 % | WEIGHT: 115 LBS | SYSTOLIC BLOOD PRESSURE: 98 MMHG

## 2023-11-03 DIAGNOSIS — Z12.11 ENCOUNTER FOR SCREENING FOR MALIGNANT NEOPLASM OF COLON: ICD-10-CM

## 2023-11-03 PROCEDURE — 99203 OFFICE O/P NEW LOW 30 MIN: CPT

## 2023-11-03 RX ORDER — TRETINOIN 0.25 MG/G
0.03 CREAM TOPICAL
Qty: 1 | Refills: 6 | Status: DISCONTINUED | COMMUNITY
Start: 2023-01-12 | End: 2023-11-03

## 2023-11-03 RX ORDER — SODIUM SULFATE, POTASSIUM SULFATE AND MAGNESIUM SULFATE 1.6; 3.13; 17.5 G/177ML; G/177ML; G/177ML
17.5-3.13-1.6 SOLUTION ORAL
Qty: 2 | Refills: 0 | Status: ACTIVE | COMMUNITY
Start: 2023-11-03 | End: 1900-01-01

## 2023-12-15 ENCOUNTER — APPOINTMENT (OUTPATIENT)
Dept: SURGICAL ONCOLOGY | Facility: CLINIC | Age: 43
End: 2023-12-15

## 2024-01-18 ENCOUNTER — APPOINTMENT (OUTPATIENT)
Dept: GASTROENTEROLOGY | Facility: AMBULATORY MEDICAL SERVICES | Age: 44
End: 2024-01-18
Payer: COMMERCIAL

## 2024-01-18 PROCEDURE — 45380 COLONOSCOPY AND BIOPSY: CPT | Mod: PT

## 2024-01-29 ENCOUNTER — APPOINTMENT (OUTPATIENT)
Dept: GASTROENTEROLOGY | Facility: CLINIC | Age: 44
End: 2024-01-29
Payer: COMMERCIAL

## 2024-01-29 PROCEDURE — 99442: CPT

## 2024-01-29 NOTE — PHYSICAL EXAM
[Alert] : alert [Normal Voice/Communication] : normal voice/communication [Hearing Threshold Finger Rub Not [place]] : hearing was normal [No Respiratory Distress] : no respiratory distress

## 2024-01-29 NOTE — HISTORY OF PRESENT ILLNESS
[FreeTextEntry1] : Colonoscopy showed 7 mm polyp in the sigmoid, which showed hyperplastic in nature, it also showed 1 small external hemorrhoid which is asymptomatic She denied any bowel symptoms.  I discussed with her management of hemorrhoids including sitz bath and topical creams Dietary recommendations discussed Follow-up as needed Repeat colonoscopy after 10 years

## 2024-02-12 ENCOUNTER — NON-APPOINTMENT (OUTPATIENT)
Age: 44
End: 2024-02-12

## 2024-03-19 ENCOUNTER — APPOINTMENT (OUTPATIENT)
Dept: DERMATOLOGY | Facility: CLINIC | Age: 44
End: 2024-03-19
Payer: COMMERCIAL

## 2024-03-19 DIAGNOSIS — D22.9 MELANOCYTIC NEVI, UNSPECIFIED: ICD-10-CM

## 2024-03-19 DIAGNOSIS — Z12.83 ENCOUNTER FOR SCREENING FOR MALIGNANT NEOPLASM OF SKIN: ICD-10-CM

## 2024-03-19 DIAGNOSIS — L70.0 ACNE VULGARIS: ICD-10-CM

## 2024-03-19 DIAGNOSIS — D48.5 NEOPLASM OF UNCERTAIN BEHAVIOR OF SKIN: ICD-10-CM

## 2024-03-19 DIAGNOSIS — D18.01 HEMANGIOMA OF SKIN AND SUBCUTANEOUS TISSUE: ICD-10-CM

## 2024-03-19 PROCEDURE — 99213 OFFICE O/P EST LOW 20 MIN: CPT | Mod: 25

## 2024-03-19 PROCEDURE — 99214 OFFICE O/P EST MOD 30 MIN: CPT | Mod: 25

## 2024-03-19 PROCEDURE — 11102 TANGNTL BX SKIN SINGLE LES: CPT

## 2024-03-19 RX ORDER — TRETINOIN 0.25 MG/G
0.03 CREAM TOPICAL
Qty: 1 | Refills: 1 | Status: ACTIVE | COMMUNITY
Start: 2024-03-19 | End: 1900-01-01

## 2024-03-26 LAB — DERMATOLOGY BIOPSY: NORMAL

## 2025-02-09 NOTE — ASU PREOPERATIVE ASSESSMENT, ADULT ([facility] ONLY) - TRANSPORT TO OR VIA
-stable on oxygen supplementation, wean as tolerated, incentive spirometer  -prn duoneb therapy   ambulatory

## 2025-06-03 ENCOUNTER — NON-APPOINTMENT (OUTPATIENT)
Age: 45
End: 2025-06-03

## 2025-06-03 ENCOUNTER — APPOINTMENT (OUTPATIENT)
Dept: DERMATOLOGY | Facility: CLINIC | Age: 45
End: 2025-06-03
Payer: COMMERCIAL

## 2025-06-03 VITALS — BODY MASS INDEX: 19.74 KG/M2 | WEIGHT: 115 LBS

## 2025-06-03 DIAGNOSIS — D18.01 HEMANGIOMA OF SKIN AND SUBCUTANEOUS TISSUE: ICD-10-CM

## 2025-06-03 DIAGNOSIS — Z12.83 ENCOUNTER FOR SCREENING FOR MALIGNANT NEOPLASM OF SKIN: ICD-10-CM

## 2025-06-03 DIAGNOSIS — D22.9 MELANOCYTIC NEVI, UNSPECIFIED: ICD-10-CM

## 2025-06-03 PROCEDURE — 99214 OFFICE O/P EST MOD 30 MIN: CPT

## 2025-06-03 PROCEDURE — G2211 COMPLEX E/M VISIT ADD ON: CPT | Mod: NC

## 2025-06-13 RX ORDER — TRETINOIN 0.5 MG/G
0.05 CREAM TOPICAL
Qty: 1 | Refills: 5 | Status: ACTIVE | COMMUNITY
Start: 2025-06-03

## (undated) DEVICE — LAP PAD W RING 18 X 18"

## (undated) DEVICE — RADIOGRAPHY DVC SPEC TRANSPEC

## (undated) DEVICE — ELCTR ROCKER SWITCH PENCIL BLUE 10FT

## (undated) DEVICE — SUT SILK 2-0 30" SH

## (undated) DEVICE — SUT VICRYL 3-0 27" SH UNDYED

## (undated) DEVICE — SUT MONOCRYL 3-0 18" PS-2 UNDYED

## (undated) DEVICE — GLV 7 PROTEXIS (WHITE)

## (undated) DEVICE — SOL IRR POUR NS 0.9% 500ML

## (undated) DEVICE — WARMING BLANKET LOWER ADULT

## (undated) DEVICE — ELCTR BOVIE TIP BLADE INSULATED 2.75" EDGE

## (undated) DEVICE — ELCTR GROUNDING PAD ADULT COVIDIEN

## (undated) DEVICE — DRSG STERISTRIPS 0.5 X 4"

## (undated) DEVICE — GLV 7.5 PROTEXIS (WHITE)

## (undated) DEVICE — GOWN LG

## (undated) DEVICE — DRAPE CHEST BREAST 106" X 122"

## (undated) DEVICE — SUT MONOCRYL 4-0 27" PS-2 UNDYED

## (undated) DEVICE — VENODYNE/SCD SLEEVE CALF MEDIUM

## (undated) DEVICE — DRSG TEGADERM 4X4.75"

## (undated) DEVICE — POSITIONER STRAP ARMBOARD VELCRO TS-30